# Patient Record
Sex: FEMALE | Race: WHITE | NOT HISPANIC OR LATINO | Employment: OTHER | URBAN - METROPOLITAN AREA
[De-identification: names, ages, dates, MRNs, and addresses within clinical notes are randomized per-mention and may not be internally consistent; named-entity substitution may affect disease eponyms.]

---

## 2023-08-24 LAB
CHLAMYDIA TRACH., AMPLIFIED: NEGATIVE
N. GONORRHEA, AMPLIFIED: NEGATIVE
URINE CULTURE: ABNORMAL

## 2023-09-12 LAB
ALANINE AMINOTRANSFERASE (SGPT) (U/L) IN SER/PLAS: 7 U/L (ref 7–45)
ALBUMIN (G/DL) IN SER/PLAS: 4.1 G/DL (ref 3.4–5)
ALKALINE PHOSPHATASE (U/L) IN SER/PLAS: 25 U/L (ref 33–110)
ANION GAP IN SER/PLAS: 15 MMOL/L (ref 10–20)
ASPARTATE AMINOTRANSFERASE (SGOT) (U/L) IN SER/PLAS: 10 U/L (ref 9–39)
BILIRUBIN TOTAL (MG/DL) IN SER/PLAS: 0.4 MG/DL (ref 0–1.2)
CALCIUM (MG/DL) IN SER/PLAS: 9.5 MG/DL (ref 8.6–10.6)
CARBON DIOXIDE, TOTAL (MMOL/L) IN SER/PLAS: 21 MMOL/L (ref 21–32)
CHLORIDE (MMOL/L) IN SER/PLAS: 104 MMOL/L (ref 98–107)
CREATININE (MG/DL) IN SER/PLAS: 0.47 MG/DL (ref 0.5–1.05)
ERYTHROCYTE DISTRIBUTION WIDTH (RATIO) BY AUTOMATED COUNT: 13.3 % (ref 11.5–14.5)
ERYTHROCYTE MEAN CORPUSCULAR HEMOGLOBIN CONCENTRATION (G/DL) BY AUTOMATED: 32.6 G/DL (ref 32–36)
ERYTHROCYTE MEAN CORPUSCULAR VOLUME (FL) BY AUTOMATED COUNT: 95 FL (ref 80–100)
ERYTHROCYTES (10*6/UL) IN BLOOD BY AUTOMATED COUNT: 3.69 X10E12/L (ref 4–5.2)
GFR FEMALE: >90 ML/MIN/1.73M2
GLUCOSE (MG/DL) IN SER/PLAS: 75 MG/DL (ref 74–99)
HEMATOCRIT (%) IN BLOOD BY AUTOMATED COUNT: 35 % (ref 36–46)
HEMOGLOBIN (G/DL) IN BLOOD: 11.4 G/DL (ref 12–16)
HEPATITIS B VIRUS SURFACE AG PRESENCE IN SERUM: NONREACTIVE
HEPATITIS C VIRUS AB PRESENCE IN SERUM: NONREACTIVE
HIV 1/ 2 AG/AB SCREEN: NONREACTIVE
LEUKOCYTES (10*3/UL) IN BLOOD BY AUTOMATED COUNT: 5.8 X10E9/L (ref 4.4–11.3)
NRBC (PER 100 WBCS) BY AUTOMATED COUNT: 0 /100 WBC (ref 0–0)
PLATELETS (10*3/UL) IN BLOOD AUTOMATED COUNT: 207 X10E9/L (ref 150–450)
POTASSIUM (MMOL/L) IN SER/PLAS: 4 MMOL/L (ref 3.5–5.3)
PROTEIN TOTAL: 6.5 G/DL (ref 6.4–8.2)
REFLEX ADDED, ANEMIA PANEL: ABNORMAL
SODIUM (MMOL/L) IN SER/PLAS: 136 MMOL/L (ref 136–145)
UREA NITROGEN (MG/DL) IN SER/PLAS: 9 MG/DL (ref 6–23)

## 2023-09-13 LAB
ABO GROUP (TYPE) IN BLOOD: NORMAL
ANTIBODY SCREEN: NORMAL
RH FACTOR: NORMAL
RUBELLA VIRUS IGG AB: POSITIVE
SYPHILIS TOTAL AB: NONREACTIVE

## 2023-09-14 LAB — BILE ACID: <1 UMOL/L (ref 0–10)

## 2023-10-09 ENCOUNTER — APPOINTMENT (OUTPATIENT)
Dept: OBSTETRICS AND GYNECOLOGY | Facility: CLINIC | Age: 33
End: 2023-10-09
Payer: COMMERCIAL

## 2023-10-11 PROBLEM — F41.8 DEPRESSION WITH ANXIETY: Status: ACTIVE | Noted: 2023-10-11

## 2023-10-11 PROBLEM — N91.1 AMENORRHEA, SECONDARY: Status: ACTIVE | Noted: 2023-10-11

## 2023-10-11 PROBLEM — Z14.1 CYSTIC FIBROSIS CARRIER: Status: ACTIVE | Noted: 2023-10-11

## 2023-10-11 RX ORDER — MAGNESIUM HYDROXIDE 400 MG/5ML
SUSPENSION, ORAL (FINAL DOSE FORM) ORAL
COMMUNITY
Start: 2023-08-22

## 2023-10-11 RX ORDER — ONDANSETRON 4 MG/1
TABLET, ORALLY DISINTEGRATING ORAL
COMMUNITY
Start: 2023-08-22 | End: 2023-10-13 | Stop reason: SDUPTHER

## 2023-10-13 ENCOUNTER — ROUTINE PRENATAL (OUTPATIENT)
Dept: OBSTETRICS AND GYNECOLOGY | Facility: CLINIC | Age: 33
End: 2023-10-13
Payer: COMMERCIAL

## 2023-10-13 VITALS — SYSTOLIC BLOOD PRESSURE: 112 MMHG | WEIGHT: 161 LBS | BODY MASS INDEX: 27.64 KG/M2 | DIASTOLIC BLOOD PRESSURE: 68 MMHG

## 2023-10-13 DIAGNOSIS — Z3A.20 20 WEEKS GESTATION OF PREGNANCY (HHS-HCC): ICD-10-CM

## 2023-10-13 DIAGNOSIS — R11.10 HYPEREMESIS: Primary | ICD-10-CM

## 2023-10-13 PROCEDURE — 0501F PRENATAL FLOW SHEET: CPT | Performed by: OBSTETRICS & GYNECOLOGY

## 2023-10-13 RX ORDER — ONDANSETRON 4 MG/1
4 TABLET, ORALLY DISINTEGRATING ORAL EVERY 8 HOURS PRN
Qty: 30 TABLET | Refills: 5 | Status: SHIPPED | OUTPATIENT
Start: 2023-10-13 | End: 2024-03-25 | Stop reason: HOSPADM

## 2023-11-10 ENCOUNTER — APPOINTMENT (OUTPATIENT)
Dept: OBSTETRICS AND GYNECOLOGY | Facility: CLINIC | Age: 33
End: 2023-11-10
Payer: COMMERCIAL

## 2023-11-10 ENCOUNTER — APPOINTMENT (OUTPATIENT)
Dept: RADIOLOGY | Facility: CLINIC | Age: 33
End: 2023-11-10
Payer: COMMERCIAL

## 2023-11-14 ENCOUNTER — ANCILLARY PROCEDURE (OUTPATIENT)
Dept: RADIOLOGY | Facility: CLINIC | Age: 33
End: 2023-11-14
Payer: COMMERCIAL

## 2023-11-14 ENCOUNTER — ROUTINE PRENATAL (OUTPATIENT)
Dept: OBSTETRICS AND GYNECOLOGY | Facility: CLINIC | Age: 33
End: 2023-11-14
Payer: COMMERCIAL

## 2023-11-14 VITALS — WEIGHT: 167 LBS | BODY MASS INDEX: 28.67 KG/M2

## 2023-11-14 DIAGNOSIS — Z36.89 SCREENING, ANTENATAL, FOR FETAL ANATOMIC SURVEY (HHS-HCC): ICD-10-CM

## 2023-11-14 DIAGNOSIS — Z3A.20 20 WEEKS GESTATION OF PREGNANCY (HHS-HCC): ICD-10-CM

## 2023-11-14 DIAGNOSIS — Z34.82 ENCOUNTER FOR SUPERVISION OF NORMAL PREGNANCY IN MULTIGRAVIDA IN SECOND TRIMESTER (HHS-HCC): ICD-10-CM

## 2023-11-14 DIAGNOSIS — Z36.2 ENCOUNTER FOR FOLLOW-UP ULTRASOUND OF FETAL ANATOMY (HHS-HCC): ICD-10-CM

## 2023-11-14 PROCEDURE — 76811 OB US DETAILED SNGL FETUS: CPT | Performed by: OBSTETRICS & GYNECOLOGY

## 2023-11-14 PROCEDURE — 0501F PRENATAL FLOW SHEET: CPT | Performed by: OBSTETRICS & GYNECOLOGY

## 2023-11-14 NOTE — PROGRESS NOTES
"Pt feels low back pain and pelvic pressure \"like late in pregnancy last time.\"  No ctx.  Cx LTC  Disc PTL guidelines, but no evidence of cervical change.    Baby active  \"Leaning towards\" RLTCS  "

## 2023-12-12 ENCOUNTER — ROUTINE PRENATAL (OUTPATIENT)
Dept: OBSTETRICS AND GYNECOLOGY | Facility: CLINIC | Age: 33
End: 2023-12-12
Payer: COMMERCIAL

## 2023-12-12 ENCOUNTER — ANCILLARY PROCEDURE (OUTPATIENT)
Dept: RADIOLOGY | Facility: CLINIC | Age: 33
End: 2023-12-12
Payer: COMMERCIAL

## 2023-12-12 VITALS — DIASTOLIC BLOOD PRESSURE: 64 MMHG | WEIGHT: 172 LBS | SYSTOLIC BLOOD PRESSURE: 98 MMHG | BODY MASS INDEX: 29.52 KG/M2

## 2023-12-12 DIAGNOSIS — Z3A.24 24 WEEKS GESTATION OF PREGNANCY (HHS-HCC): ICD-10-CM

## 2023-12-12 DIAGNOSIS — Z34.82 ENCOUNTER FOR SUPERVISION OF NORMAL PREGNANCY IN MULTIGRAVIDA IN SECOND TRIMESTER (HHS-HCC): ICD-10-CM

## 2023-12-12 DIAGNOSIS — Z36.2 ENCOUNTER FOR FOLLOW-UP ULTRASOUND OF FETAL ANATOMY (HHS-HCC): ICD-10-CM

## 2023-12-12 PROCEDURE — 0501F PRENATAL FLOW SHEET: CPT | Performed by: OBSTETRICS & GYNECOLOGY

## 2023-12-12 PROCEDURE — 76815 OB US LIMITED FETUS(S): CPT | Performed by: OBSTETRICS & GYNECOLOGY

## 2024-01-10 ENCOUNTER — APPOINTMENT (OUTPATIENT)
Dept: OBSTETRICS AND GYNECOLOGY | Facility: CLINIC | Age: 34
End: 2024-01-10
Payer: COMMERCIAL

## 2024-01-12 ENCOUNTER — APPOINTMENT (OUTPATIENT)
Dept: OBSTETRICS AND GYNECOLOGY | Facility: CLINIC | Age: 34
End: 2024-01-12
Payer: COMMERCIAL

## 2024-01-15 ENCOUNTER — ROUTINE PRENATAL (OUTPATIENT)
Dept: OBSTETRICS AND GYNECOLOGY | Facility: CLINIC | Age: 34
End: 2024-01-15
Payer: COMMERCIAL

## 2024-01-15 VITALS — DIASTOLIC BLOOD PRESSURE: 60 MMHG | WEIGHT: 175 LBS | BODY MASS INDEX: 30.04 KG/M2 | SYSTOLIC BLOOD PRESSURE: 112 MMHG

## 2024-01-15 DIAGNOSIS — Z34.83 MULTIGRAVIDA IN THIRD TRIMESTER (HHS-HCC): Primary | ICD-10-CM

## 2024-01-15 PROCEDURE — 0501F PRENATAL FLOW SHEET: CPT | Performed by: OBSTETRICS & GYNECOLOGY

## 2024-01-15 PROCEDURE — 36415 COLL VENOUS BLD VENIPUNCTURE: CPT

## 2024-01-15 PROCEDURE — 85027 COMPLETE CBC AUTOMATED: CPT

## 2024-01-15 PROCEDURE — 82947 ASSAY GLUCOSE BLOOD QUANT: CPT

## 2024-01-15 NOTE — PROGRESS NOTES
Feels well.  Baby active 1* gtt today.  Revisited route of delivery and pt would like TOLAC.  Have Disc Alt/R/B  MFMU 64%

## 2024-01-16 ENCOUNTER — TELEPHONE (OUTPATIENT)
Dept: OBSTETRICS AND GYNECOLOGY | Facility: CLINIC | Age: 34
End: 2024-01-16
Payer: COMMERCIAL

## 2024-01-16 LAB
ERYTHROCYTE [DISTWIDTH] IN BLOOD BY AUTOMATED COUNT: 13 % (ref 11.5–14.5)
GLUCOSE 1H P 50 G GLC PO SERPL-MCNC: 115 MG/DL
HCT VFR BLD AUTO: 31.2 % (ref 36–46)
HGB BLD-MCNC: 10.1 G/DL (ref 12–16)
MCH RBC QN AUTO: 31.6 PG (ref 26–34)
MCHC RBC AUTO-ENTMCNC: 32.4 G/DL (ref 32–36)
MCV RBC AUTO: 98 FL (ref 80–100)
NRBC BLD-RTO: 0 /100 WBCS (ref 0–0)
PLATELET # BLD AUTO: 203 X10*3/UL (ref 150–450)
RBC # BLD AUTO: 3.2 X10*6/UL (ref 4–5.2)
WBC # BLD AUTO: 7.8 X10*3/UL (ref 4.4–11.3)

## 2024-01-16 NOTE — TELEPHONE ENCOUNTER
----- Message from Yuniel Preston MD sent at 1/16/2024  6:22 AM EST -----  No MyChart - Pls call and inform - 1 hr glucose test nl but mildly anemic at 10.5  Rec fe every other day.    Thx

## 2024-02-05 ENCOUNTER — ROUTINE PRENATAL (OUTPATIENT)
Dept: OBSTETRICS AND GYNECOLOGY | Facility: CLINIC | Age: 34
End: 2024-02-05
Payer: COMMERCIAL

## 2024-02-05 ENCOUNTER — APPOINTMENT (OUTPATIENT)
Dept: OBSTETRICS AND GYNECOLOGY | Facility: CLINIC | Age: 34
End: 2024-02-05
Payer: COMMERCIAL

## 2024-02-05 VITALS — WEIGHT: 177 LBS | DIASTOLIC BLOOD PRESSURE: 80 MMHG | BODY MASS INDEX: 30.38 KG/M2 | SYSTOLIC BLOOD PRESSURE: 130 MMHG

## 2024-02-05 DIAGNOSIS — Z34.83 MULTIGRAVIDA IN THIRD TRIMESTER (HHS-HCC): Primary | ICD-10-CM

## 2024-02-05 DIAGNOSIS — Z3A.32 32 WEEKS GESTATION OF PREGNANCY (HHS-HCC): ICD-10-CM

## 2024-02-05 PROCEDURE — 90471 IMMUNIZATION ADMIN: CPT | Performed by: OBSTETRICS & GYNECOLOGY

## 2024-02-05 PROCEDURE — 0501F PRENATAL FLOW SHEET: CPT | Performed by: OBSTETRICS & GYNECOLOGY

## 2024-02-05 PROCEDURE — 90715 TDAP VACCINE 7 YRS/> IM: CPT | Performed by: OBSTETRICS & GYNECOLOGY

## 2024-02-05 NOTE — PROGRESS NOTES
Patient ID: Magdalena Bai is a 33 y.o. female.    TDAP    Given 2/5/24  IM Right Deltoid  Lot# [id number]  Exp: 2/2/26  NDC: [id number]

## 2024-02-19 ENCOUNTER — ROUTINE PRENATAL (OUTPATIENT)
Dept: OBSTETRICS AND GYNECOLOGY | Facility: CLINIC | Age: 34
End: 2024-02-19
Payer: COMMERCIAL

## 2024-02-19 VITALS — DIASTOLIC BLOOD PRESSURE: 78 MMHG | BODY MASS INDEX: 31.41 KG/M2 | SYSTOLIC BLOOD PRESSURE: 118 MMHG | WEIGHT: 183 LBS

## 2024-02-19 DIAGNOSIS — Z3A.34 34 WEEKS GESTATION OF PREGNANCY (HHS-HCC): Primary | ICD-10-CM

## 2024-02-19 PROCEDURE — 0501F PRENATAL FLOW SHEET: CPT | Performed by: OBSTETRICS & GYNECOLOGY

## 2024-02-19 NOTE — PROGRESS NOTES
Feels well except acid reflux - pepcid prn as tums ineffective.    Had root canal - feels better.

## 2024-03-05 ENCOUNTER — APPOINTMENT (OUTPATIENT)
Dept: OBSTETRICS AND GYNECOLOGY | Facility: CLINIC | Age: 34
End: 2024-03-05
Payer: COMMERCIAL

## 2024-03-05 ENCOUNTER — APPOINTMENT (OUTPATIENT)
Dept: RADIOLOGY | Facility: CLINIC | Age: 34
End: 2024-03-05
Payer: COMMERCIAL

## 2024-03-07 ENCOUNTER — ROUTINE PRENATAL (OUTPATIENT)
Dept: OBSTETRICS AND GYNECOLOGY | Facility: CLINIC | Age: 34
End: 2024-03-07
Payer: COMMERCIAL

## 2024-03-07 ENCOUNTER — HOSPITAL ENCOUNTER (OUTPATIENT)
Dept: RADIOLOGY | Facility: CLINIC | Age: 34
Discharge: HOME | End: 2024-03-07
Payer: COMMERCIAL

## 2024-03-07 VITALS — DIASTOLIC BLOOD PRESSURE: 80 MMHG | BODY MASS INDEX: 31.93 KG/M2 | SYSTOLIC BLOOD PRESSURE: 130 MMHG | WEIGHT: 186 LBS

## 2024-03-07 DIAGNOSIS — Z3A.32 32 WEEKS GESTATION OF PREGNANCY (HHS-HCC): ICD-10-CM

## 2024-03-07 DIAGNOSIS — Z34.83 MULTIGRAVIDA IN THIRD TRIMESTER (HHS-HCC): Primary | ICD-10-CM

## 2024-03-07 PROCEDURE — 87081 CULTURE SCREEN ONLY: CPT

## 2024-03-07 PROCEDURE — 76815 OB US LIMITED FETUS(S): CPT | Performed by: OBSTETRICS & GYNECOLOGY

## 2024-03-07 PROCEDURE — 76819 FETAL BIOPHYS PROFIL W/O NST: CPT | Performed by: OBSTETRICS & GYNECOLOGY

## 2024-03-07 PROCEDURE — 0501F PRENATAL FLOW SHEET: CPT | Performed by: OBSTETRICS & GYNECOLOGY

## 2024-03-07 NOTE — PROGRESS NOTES
Baby active   GBS obtained   Cx closed.  Notes increase in edema, but BP here and at home 120-130/60-70's

## 2024-03-09 LAB — GP B STREP GENITAL QL CULT: NORMAL

## 2024-03-12 ENCOUNTER — APPOINTMENT (OUTPATIENT)
Dept: OBSTETRICS AND GYNECOLOGY | Facility: CLINIC | Age: 34
End: 2024-03-12
Payer: COMMERCIAL

## 2024-03-18 ENCOUNTER — ROUTINE PRENATAL (OUTPATIENT)
Dept: OBSTETRICS AND GYNECOLOGY | Facility: CLINIC | Age: 34
End: 2024-03-18
Payer: COMMERCIAL

## 2024-03-18 VITALS — WEIGHT: 190 LBS | DIASTOLIC BLOOD PRESSURE: 78 MMHG | SYSTOLIC BLOOD PRESSURE: 122 MMHG | BODY MASS INDEX: 32.61 KG/M2

## 2024-03-18 DIAGNOSIS — Z3A.38 38 WEEKS GESTATION OF PREGNANCY (HHS-HCC): Primary | ICD-10-CM

## 2024-03-18 PROCEDURE — 59426 ANTEPARTUM CARE ONLY: CPT | Performed by: OBSTETRICS & GYNECOLOGY

## 2024-03-22 ENCOUNTER — ANESTHESIA (OUTPATIENT)
Dept: OBSTETRICS AND GYNECOLOGY | Facility: HOSPITAL | Age: 34
End: 2024-03-22
Payer: COMMERCIAL

## 2024-03-22 ENCOUNTER — HOSPITAL ENCOUNTER (INPATIENT)
Facility: HOSPITAL | Age: 34
LOS: 3 days | Discharge: HOME | End: 2024-03-25
Attending: OBSTETRICS & GYNECOLOGY | Admitting: OBSTETRICS & GYNECOLOGY
Payer: COMMERCIAL

## 2024-03-22 ENCOUNTER — ANESTHESIA EVENT (OUTPATIENT)
Dept: OBSTETRICS AND GYNECOLOGY | Facility: HOSPITAL | Age: 34
End: 2024-03-22
Payer: COMMERCIAL

## 2024-03-22 DIAGNOSIS — Z34.83 MULTIGRAVIDA IN THIRD TRIMESTER (HHS-HCC): Primary | ICD-10-CM

## 2024-03-22 DIAGNOSIS — O47.9 UTERINE CONTRACTIONS (HHS-HCC): ICD-10-CM

## 2024-03-22 DIAGNOSIS — Z98.891 HISTORY OF LOW TRANSVERSE CESAREAN SECTION: ICD-10-CM

## 2024-03-22 DIAGNOSIS — O34.211 ENCOUNTER FOR MATERNAL CARE FOR LOW TRANSVERSE SCAR FROM REPEAT CESAREAN DELIVERY (HHS-HCC): ICD-10-CM

## 2024-03-22 LAB
ABO GROUP (TYPE) IN BLOOD: NORMAL
ANTIBODY SCREEN: NORMAL
ERYTHROCYTE [DISTWIDTH] IN BLOOD BY AUTOMATED COUNT: 13.9 % (ref 11.5–14.5)
HCT VFR BLD AUTO: 27.4 % (ref 36–46)
HGB BLD-MCNC: 8.8 G/DL (ref 12–16)
MCH RBC QN AUTO: 27.9 PG (ref 26–34)
MCHC RBC AUTO-ENTMCNC: 32.1 G/DL (ref 32–36)
MCV RBC AUTO: 87 FL (ref 80–100)
NRBC BLD-RTO: 0 /100 WBCS (ref 0–0)
PLATELET # BLD AUTO: 216 X10*3/UL (ref 150–450)
RBC # BLD AUTO: 3.15 X10*6/UL (ref 4–5.2)
RH FACTOR (ANTIGEN D): NORMAL
WBC # BLD AUTO: 8.4 X10*3/UL (ref 4.4–11.3)

## 2024-03-22 PROCEDURE — 86901 BLOOD TYPING SEROLOGIC RH(D): CPT | Performed by: NURSE PRACTITIONER

## 2024-03-22 PROCEDURE — 36415 COLL VENOUS BLD VENIPUNCTURE: CPT | Performed by: OBSTETRICS & GYNECOLOGY

## 2024-03-22 PROCEDURE — 1220000001 HC OB SEMI-PRIVATE ROOM DAILY

## 2024-03-22 PROCEDURE — 86920 COMPATIBILITY TEST SPIN: CPT

## 2024-03-22 PROCEDURE — 85027 COMPLETE CBC AUTOMATED: CPT | Performed by: NURSE PRACTITIONER

## 2024-03-22 PROCEDURE — 86780 TREPONEMA PALLIDUM: CPT | Mod: AHULAB | Performed by: NURSE PRACTITIONER

## 2024-03-22 PROCEDURE — 2500000004 HC RX 250 GENERAL PHARMACY W/ HCPCS (ALT 636 FOR OP/ED): Performed by: NURSE PRACTITIONER

## 2024-03-22 PROCEDURE — 36415 COLL VENOUS BLD VENIPUNCTURE: CPT | Performed by: NURSE PRACTITIONER

## 2024-03-22 RX ORDER — OXYTOCIN 10 [USP'U]/ML
10 INJECTION, SOLUTION INTRAMUSCULAR; INTRAVENOUS ONCE AS NEEDED
Status: DISCONTINUED | OUTPATIENT
Start: 2024-03-22 | End: 2024-03-23

## 2024-03-22 RX ORDER — MISOPROSTOL 200 UG/1
800 TABLET ORAL ONCE AS NEEDED
Status: DISCONTINUED | OUTPATIENT
Start: 2024-03-22 | End: 2024-03-23

## 2024-03-22 RX ORDER — METOCLOPRAMIDE 10 MG/1
10 TABLET ORAL EVERY 6 HOURS PRN
Status: DISCONTINUED | OUTPATIENT
Start: 2024-03-22 | End: 2024-03-23

## 2024-03-22 RX ORDER — ONDANSETRON 4 MG/1
4 TABLET, FILM COATED ORAL EVERY 6 HOURS PRN
Status: DISCONTINUED | OUTPATIENT
Start: 2024-03-22 | End: 2024-03-23

## 2024-03-22 RX ORDER — LOPERAMIDE HYDROCHLORIDE 2 MG/1
4 CAPSULE ORAL EVERY 2 HOUR PRN
Status: DISCONTINUED | OUTPATIENT
Start: 2024-03-22 | End: 2024-03-23

## 2024-03-22 RX ORDER — CEFAZOLIN SODIUM 2 G/100ML
2 INJECTION, SOLUTION INTRAVENOUS ONCE
Status: COMPLETED | OUTPATIENT
Start: 2024-03-22 | End: 2024-03-22

## 2024-03-22 RX ORDER — ONDANSETRON HYDROCHLORIDE 2 MG/ML
4 INJECTION, SOLUTION INTRAVENOUS EVERY 6 HOURS PRN
Status: DISCONTINUED | OUTPATIENT
Start: 2024-03-22 | End: 2024-03-23

## 2024-03-22 RX ORDER — SODIUM CHLORIDE, SODIUM LACTATE, POTASSIUM CHLORIDE, CALCIUM CHLORIDE 600; 310; 30; 20 MG/100ML; MG/100ML; MG/100ML; MG/100ML
125 INJECTION, SOLUTION INTRAVENOUS CONTINUOUS
Status: DISCONTINUED | OUTPATIENT
Start: 2024-03-22 | End: 2024-03-23

## 2024-03-22 RX ORDER — LABETALOL HYDROCHLORIDE 5 MG/ML
20 INJECTION, SOLUTION INTRAVENOUS ONCE AS NEEDED
Status: DISCONTINUED | OUTPATIENT
Start: 2024-03-22 | End: 2024-03-23

## 2024-03-22 RX ORDER — METOCLOPRAMIDE HYDROCHLORIDE 5 MG/ML
10 INJECTION INTRAMUSCULAR; INTRAVENOUS EVERY 6 HOURS PRN
Status: DISCONTINUED | OUTPATIENT
Start: 2024-03-22 | End: 2024-03-23

## 2024-03-22 RX ORDER — OXYTOCIN/0.9 % SODIUM CHLORIDE 30/500 ML
60 PLASTIC BAG, INJECTION (ML) INTRAVENOUS ONCE AS NEEDED
Status: COMPLETED | OUTPATIENT
Start: 2024-03-22 | End: 2024-03-23

## 2024-03-22 RX ORDER — HYDRALAZINE HYDROCHLORIDE 20 MG/ML
5 INJECTION INTRAMUSCULAR; INTRAVENOUS ONCE AS NEEDED
Status: DISCONTINUED | OUTPATIENT
Start: 2024-03-22 | End: 2024-03-23

## 2024-03-22 RX ORDER — TERBUTALINE SULFATE 1 MG/ML
0.25 INJECTION SUBCUTANEOUS ONCE AS NEEDED
Status: DISCONTINUED | OUTPATIENT
Start: 2024-03-22 | End: 2024-03-23

## 2024-03-22 RX ORDER — TRANEXAMIC ACID 100 MG/ML
1000 INJECTION, SOLUTION INTRAVENOUS ONCE AS NEEDED
Status: DISCONTINUED | OUTPATIENT
Start: 2024-03-22 | End: 2024-03-23

## 2024-03-22 RX ORDER — CARBOPROST TROMETHAMINE 250 UG/ML
250 INJECTION, SOLUTION INTRAMUSCULAR ONCE AS NEEDED
Status: DISCONTINUED | OUTPATIENT
Start: 2024-03-22 | End: 2024-03-23

## 2024-03-22 RX ORDER — NIFEDIPINE 10 MG/1
10 CAPSULE ORAL ONCE AS NEEDED
Status: DISCONTINUED | OUTPATIENT
Start: 2024-03-22 | End: 2024-03-23

## 2024-03-22 RX ORDER — METHYLERGONOVINE MALEATE 0.2 MG/ML
0.2 INJECTION INTRAVENOUS ONCE AS NEEDED
Status: DISCONTINUED | OUTPATIENT
Start: 2024-03-22 | End: 2024-03-23

## 2024-03-22 RX ORDER — LIDOCAINE HYDROCHLORIDE 10 MG/ML
30 INJECTION INFILTRATION; PERINEURAL ONCE AS NEEDED
Status: DISCONTINUED | OUTPATIENT
Start: 2024-03-22 | End: 2024-03-23

## 2024-03-22 RX ADMIN — SODIUM CHLORIDE, POTASSIUM CHLORIDE, SODIUM LACTATE AND CALCIUM CHLORIDE 125 ML/HR: 600; 310; 30; 20 INJECTION, SOLUTION INTRAVENOUS at 22:15

## 2024-03-22 RX ADMIN — SODIUM CHLORIDE, POTASSIUM CHLORIDE, SODIUM LACTATE AND CALCIUM CHLORIDE 500 ML: 600; 310; 30; 20 INJECTION, SOLUTION INTRAVENOUS at 23:13

## 2024-03-22 RX ADMIN — CEFAZOLIN SODIUM 2 G: 2 INJECTION, SOLUTION INTRAVENOUS at 23:14

## 2024-03-22 RX ADMIN — METOCLOPRAMIDE 10 MG: 5 INJECTION, SOLUTION INTRAMUSCULAR; INTRAVENOUS at 23:55

## 2024-03-22 SDOH — HEALTH STABILITY: MENTAL HEALTH: WERE YOU ABLE TO COMPLETE ALL THE BEHAVIORAL HEALTH SCREENINGS?: YES

## 2024-03-22 SDOH — HEALTH STABILITY: MENTAL HEALTH: WISH TO BE DEAD (PAST 1 MONTH): NO

## 2024-03-22 SDOH — SOCIAL STABILITY: SOCIAL INSECURITY: DOES ANYONE TRY TO KEEP YOU FROM HAVING/CONTACTING OTHER FRIENDS OR DOING THINGS OUTSIDE YOUR HOME?: NO

## 2024-03-22 SDOH — HEALTH STABILITY: MENTAL HEALTH: SUICIDAL BEHAVIOR (LIFETIME): NO

## 2024-03-22 SDOH — SOCIAL STABILITY: SOCIAL INSECURITY: HAVE YOU HAD THOUGHTS OF HARMING ANYONE ELSE?: NO

## 2024-03-22 SDOH — SOCIAL STABILITY: SOCIAL INSECURITY: HAS ANYONE EVER THREATENED TO HURT YOUR FAMILY OR YOUR PETS?: NO

## 2024-03-22 SDOH — ECONOMIC STABILITY: HOUSING INSECURITY: DO YOU FEEL UNSAFE GOING BACK TO THE PLACE WHERE YOU ARE LIVING?: NO

## 2024-03-22 SDOH — SOCIAL STABILITY: SOCIAL INSECURITY: ARE YOU OR HAVE YOU BEEN THREATENED OR ABUSED PHYSICALLY, EMOTIONALLY, OR SEXUALLY BY ANYONE?: NO

## 2024-03-22 SDOH — SOCIAL STABILITY: SOCIAL INSECURITY: PHYSICAL ABUSE: DENIES

## 2024-03-22 SDOH — SOCIAL STABILITY: SOCIAL INSECURITY: VERBAL ABUSE: DENIES

## 2024-03-22 SDOH — SOCIAL STABILITY: SOCIAL INSECURITY: ARE THERE ANY APPARENT SIGNS OF INJURIES/BEHAVIORS THAT COULD BE RELATED TO ABUSE/NEGLECT?: NO

## 2024-03-22 SDOH — HEALTH STABILITY: MENTAL HEALTH: HAVE YOU USED ANY PRESCRIPTION DRUGS OTHER THAN PRESCRIBED IN THE PAST 12 MONTHS?: NO

## 2024-03-22 SDOH — HEALTH STABILITY: MENTAL HEALTH: CURRENT SMOKER: 0

## 2024-03-22 SDOH — SOCIAL STABILITY: SOCIAL INSECURITY: ABUSE SCREEN: ADULT

## 2024-03-22 SDOH — HEALTH STABILITY: MENTAL HEALTH: HAVE YOU USED ANY SUBSTANCES (CANABIS, COCAINE, HEROIN, HALLUCINOGENS, INHALANTS, ETC.) IN THE PAST 12 MONTHS?: NO

## 2024-03-22 SDOH — SOCIAL STABILITY: SOCIAL INSECURITY: DO YOU FEEL ANYONE HAS EXPLOITED OR TAKEN ADVANTAGE OF YOU FINANCIALLY OR OF YOUR PERSONAL PROPERTY?: NO

## 2024-03-22 SDOH — HEALTH STABILITY: MENTAL HEALTH: NON-SPECIFIC ACTIVE SUICIDAL THOUGHTS (PAST 1 MONTH): NO

## 2024-03-22 ASSESSMENT — PATIENT HEALTH QUESTIONNAIRE - PHQ9
2. FEELING DOWN, DEPRESSED OR HOPELESS: NOT AT ALL
1. LITTLE INTEREST OR PLEASURE IN DOING THINGS: NOT AT ALL
SUM OF ALL RESPONSES TO PHQ9 QUESTIONS 1 & 2: 0

## 2024-03-22 ASSESSMENT — LIFESTYLE VARIABLES
HOW OFTEN DO YOU HAVE A DRINK CONTAINING ALCOHOL: NEVER
HOW OFTEN DO YOU HAVE 6 OR MORE DRINKS ON ONE OCCASION: NEVER
SKIP TO QUESTIONS 9-10: 1
AUDIT-C TOTAL SCORE: 0
AUDIT-C TOTAL SCORE: 0
HOW MANY STANDARD DRINKS CONTAINING ALCOHOL DO YOU HAVE ON A TYPICAL DAY: PATIENT DOES NOT DRINK

## 2024-03-22 ASSESSMENT — PAIN SCALES - GENERAL: PAINLEVEL_OUTOF10: 8

## 2024-03-23 LAB
ABO GROUP (TYPE) IN BLOOD: NORMAL
BASE EXCESS BLDCOA CALC-SCNC: -5.4 MMOL/L (ref -10.8–-0.5)
BASE EXCESS BLDCOV CALC-SCNC: -5.4 MMOL/L (ref -8.1–-0.5)
BODY TEMPERATURE: 37 DEGREES CELSIUS
BODY TEMPERATURE: 37 DEGREES CELSIUS
ERYTHROCYTE [DISTWIDTH] IN BLOOD BY AUTOMATED COUNT: 13.9 % (ref 11.5–14.5)
HCO3 BLDCOA-SCNC: 24.6 MMOL/L (ref 15–29)
HCO3 BLDCOV-SCNC: 19.2 MMOL/L (ref 16–26)
HCT VFR BLD AUTO: 24 % (ref 36–46)
HGB BLD-MCNC: 7.3 G/DL (ref 12–16)
HOLD SPECIMEN: NORMAL
INHALED O2 CONCENTRATION: 21 %
INHALED O2 CONCENTRATION: 21 %
MCH RBC QN AUTO: 27.7 PG (ref 26–34)
MCHC RBC AUTO-ENTMCNC: 30.4 G/DL (ref 32–36)
MCV RBC AUTO: 91 FL (ref 80–100)
NRBC BLD-RTO: 0 /100 WBCS (ref 0–0)
OXYHGB MFR BLDCOA: 24.5 % (ref 94–98)
OXYHGB MFR BLDCOV: 85.9 % (ref 94–98)
PCO2 BLDCOA: 69 MM HG (ref 31–75)
PCO2 BLDCOV: 34 MM HG (ref 22–53)
PH BLDCOA: 7.16 PH (ref 7.08–7.39)
PH BLDCOV: 7.36 PH (ref 7.19–7.47)
PLATELET # BLD AUTO: 159 X10*3/UL (ref 150–450)
PO2 BLDCOA: 24 MM HG (ref 5–31)
PO2 BLDCOV: 54 MM HG (ref 13–37)
RBC # BLD AUTO: 2.64 X10*6/UL (ref 4–5.2)
RH FACTOR (ANTIGEN D): NORMAL
SAO2 % BLDCOA: 25 % (ref 3–69)
SAO2 % BLDCOV: 88 % (ref 16–84)
TEST COMMENT: ABNORMAL
TEST COMMENT: ABNORMAL
TREPONEMA PALLIDUM IGG+IGM AB [PRESENCE] IN SERUM OR PLASMA BY IMMUNOASSAY: NONREACTIVE
WBC # BLD AUTO: 9 X10*3/UL (ref 4.4–11.3)

## 2024-03-23 PROCEDURE — 01961 ANES CESAREAN DELIVERY ONLY: CPT | Performed by: NURSE ANESTHETIST, CERTIFIED REGISTERED

## 2024-03-23 PROCEDURE — A6213 FOAM DRG >16<=48 SQ IN W/BDR: HCPCS | Performed by: OBSTETRICS & GYNECOLOGY

## 2024-03-23 PROCEDURE — 2500000004 HC RX 250 GENERAL PHARMACY W/ HCPCS (ALT 636 FOR OP/ED): Performed by: NURSE PRACTITIONER

## 2024-03-23 PROCEDURE — 3700000018 HC OB ANESTHESIA C-SECTION: Performed by: OBSTETRICS & GYNECOLOGY

## 2024-03-23 PROCEDURE — 82805 BLOOD GASES W/O2 SATURATION: CPT | Performed by: NURSE PRACTITIONER

## 2024-03-23 PROCEDURE — 85027 COMPLETE CBC AUTOMATED: CPT | Performed by: NURSE PRACTITIONER

## 2024-03-23 PROCEDURE — 7100000016 HC LABOR RECOVERY PER HOUR

## 2024-03-23 PROCEDURE — 59514 CESAREAN DELIVERY ONLY: CPT | Performed by: OBSTETRICS & GYNECOLOGY

## 2024-03-23 PROCEDURE — 2500000005 HC RX 250 GENERAL PHARMACY W/O HCPCS: Performed by: NURSE PRACTITIONER

## 2024-03-23 PROCEDURE — 2720000007 HC OR 272 NO HCPCS: Performed by: OBSTETRICS & GYNECOLOGY

## 2024-03-23 PROCEDURE — 7100000016 HC LABOR RECOVERY PER HOUR: Performed by: OBSTETRICS & GYNECOLOGY

## 2024-03-23 PROCEDURE — 1100000001 HC PRIVATE ROOM DAILY

## 2024-03-23 PROCEDURE — 2500000004 HC RX 250 GENERAL PHARMACY W/ HCPCS (ALT 636 FOR OP/ED): Performed by: NURSE ANESTHETIST, CERTIFIED REGISTERED

## 2024-03-23 PROCEDURE — 36415 COLL VENOUS BLD VENIPUNCTURE: CPT | Performed by: NURSE PRACTITIONER

## 2024-03-23 PROCEDURE — 59050 FETAL MONITOR W/REPORT: CPT

## 2024-03-23 PROCEDURE — 2500000005 HC RX 250 GENERAL PHARMACY W/O HCPCS: Performed by: NURSE ANESTHETIST, CERTIFIED REGISTERED

## 2024-03-23 PROCEDURE — 7210000002 HC LABOR PER HOUR

## 2024-03-23 RX ORDER — LABETALOL HYDROCHLORIDE 5 MG/ML
20 INJECTION, SOLUTION INTRAVENOUS ONCE AS NEEDED
Status: DISCONTINUED | OUTPATIENT
Start: 2024-03-23 | End: 2024-03-25 | Stop reason: HOSPADM

## 2024-03-23 RX ORDER — ONDANSETRON 4 MG/1
4 TABLET, FILM COATED ORAL EVERY 6 HOURS PRN
Status: DISCONTINUED | OUTPATIENT
Start: 2024-03-23 | End: 2024-03-25 | Stop reason: HOSPADM

## 2024-03-23 RX ORDER — DIPHENHYDRAMINE HYDROCHLORIDE 50 MG/ML
25 INJECTION INTRAMUSCULAR; INTRAVENOUS EVERY 4 HOURS PRN
Status: DISCONTINUED | OUTPATIENT
Start: 2024-03-23 | End: 2024-03-25 | Stop reason: HOSPADM

## 2024-03-23 RX ORDER — MORPHINE SULFATE 1 MG/ML
INJECTION, SOLUTION EPIDURAL; INTRATHECAL; INTRAVENOUS AS NEEDED
Status: DISCONTINUED | OUTPATIENT
Start: 2024-03-23 | End: 2024-03-23

## 2024-03-23 RX ORDER — LOPERAMIDE HYDROCHLORIDE 2 MG/1
4 CAPSULE ORAL EVERY 2 HOUR PRN
Status: DISCONTINUED | OUTPATIENT
Start: 2024-03-23 | End: 2024-03-25 | Stop reason: HOSPADM

## 2024-03-23 RX ORDER — CARBOPROST TROMETHAMINE 250 UG/ML
250 INJECTION, SOLUTION INTRAMUSCULAR ONCE AS NEEDED
Status: DISCONTINUED | OUTPATIENT
Start: 2024-03-23 | End: 2024-03-25 | Stop reason: HOSPADM

## 2024-03-23 RX ORDER — POLYETHYLENE GLYCOL 3350 17 G/17G
17 POWDER, FOR SOLUTION ORAL 2 TIMES DAILY PRN
Status: DISCONTINUED | OUTPATIENT
Start: 2024-03-23 | End: 2024-03-25 | Stop reason: HOSPADM

## 2024-03-23 RX ORDER — METHYLERGONOVINE MALEATE 0.2 MG/ML
0.2 INJECTION INTRAVENOUS ONCE AS NEEDED
Status: DISCONTINUED | OUTPATIENT
Start: 2024-03-23 | End: 2024-03-25 | Stop reason: HOSPADM

## 2024-03-23 RX ORDER — TRANEXAMIC ACID 100 MG/ML
1000 INJECTION, SOLUTION INTRAVENOUS ONCE AS NEEDED
Status: DISCONTINUED | OUTPATIENT
Start: 2024-03-23 | End: 2024-03-25 | Stop reason: HOSPADM

## 2024-03-23 RX ORDER — OXYTOCIN 10 [USP'U]/ML
10 INJECTION, SOLUTION INTRAMUSCULAR; INTRAVENOUS ONCE AS NEEDED
Status: DISCONTINUED | OUTPATIENT
Start: 2024-03-23 | End: 2024-03-25 | Stop reason: HOSPADM

## 2024-03-23 RX ORDER — KETOROLAC TROMETHAMINE 30 MG/ML
INJECTION, SOLUTION INTRAMUSCULAR; INTRAVENOUS AS NEEDED
Status: DISCONTINUED | OUTPATIENT
Start: 2024-03-23 | End: 2024-03-23

## 2024-03-23 RX ORDER — ADHESIVE BANDAGE
10 BANDAGE TOPICAL
Status: DISCONTINUED | OUTPATIENT
Start: 2024-03-23 | End: 2024-03-25 | Stop reason: HOSPADM

## 2024-03-23 RX ORDER — HYDROMORPHONE HYDROCHLORIDE 1 MG/ML
0.2 INJECTION, SOLUTION INTRAMUSCULAR; INTRAVENOUS; SUBCUTANEOUS EVERY 5 MIN PRN
Status: DISCONTINUED | OUTPATIENT
Start: 2024-03-23 | End: 2024-03-23 | Stop reason: HOSPADM

## 2024-03-23 RX ORDER — OXYCODONE HYDROCHLORIDE 5 MG/1
5 TABLET ORAL EVERY 6 HOURS PRN
Status: DISCONTINUED | OUTPATIENT
Start: 2024-03-23 | End: 2024-03-23

## 2024-03-23 RX ORDER — DIPHENHYDRAMINE HCL 25 MG
25 CAPSULE ORAL EVERY 4 HOURS PRN
Status: DISCONTINUED | OUTPATIENT
Start: 2024-03-23 | End: 2024-03-25 | Stop reason: HOSPADM

## 2024-03-23 RX ORDER — PHENYLEPHRINE HYDROCHLORIDE 10 MG/ML
INJECTION INTRAVENOUS AS NEEDED
Status: DISCONTINUED | OUTPATIENT
Start: 2024-03-23 | End: 2024-03-23

## 2024-03-23 RX ORDER — LIDOCAINE 560 MG/1
1 PATCH PERCUTANEOUS; TOPICAL; TRANSDERMAL
Status: DISCONTINUED | OUTPATIENT
Start: 2024-03-23 | End: 2024-03-25 | Stop reason: HOSPADM

## 2024-03-23 RX ORDER — NIFEDIPINE 10 MG/1
10 CAPSULE ORAL ONCE AS NEEDED
Status: DISCONTINUED | OUTPATIENT
Start: 2024-03-23 | End: 2024-03-25 | Stop reason: HOSPADM

## 2024-03-23 RX ORDER — KETOROLAC TROMETHAMINE 30 MG/ML
30 INJECTION, SOLUTION INTRAMUSCULAR; INTRAVENOUS EVERY 6 HOURS
Status: COMPLETED | OUTPATIENT
Start: 2024-03-23 | End: 2024-03-23

## 2024-03-23 RX ORDER — BUPIVACAINE HYDROCHLORIDE 7.5 MG/ML
INJECTION, SOLUTION INTRASPINAL AS NEEDED
Status: DISCONTINUED | OUTPATIENT
Start: 2024-03-23 | End: 2024-03-23

## 2024-03-23 RX ORDER — OXYCODONE HYDROCHLORIDE 5 MG/1
5 TABLET ORAL EVERY 4 HOURS PRN
Status: DISCONTINUED | OUTPATIENT
Start: 2024-03-24 | End: 2024-03-25 | Stop reason: HOSPADM

## 2024-03-23 RX ORDER — HYDROMORPHONE HYDROCHLORIDE 1 MG/ML
0.2 INJECTION, SOLUTION INTRAMUSCULAR; INTRAVENOUS; SUBCUTANEOUS EVERY 5 MIN PRN
Status: DISCONTINUED | OUTPATIENT
Start: 2024-03-23 | End: 2024-03-25 | Stop reason: HOSPADM

## 2024-03-23 RX ORDER — OXYTOCIN/0.9 % SODIUM CHLORIDE 30/500 ML
60 PLASTIC BAG, INJECTION (ML) INTRAVENOUS ONCE AS NEEDED
Status: DISCONTINUED | OUTPATIENT
Start: 2024-03-23 | End: 2024-03-25 | Stop reason: HOSPADM

## 2024-03-23 RX ORDER — IBUPROFEN 600 MG/1
600 TABLET ORAL EVERY 6 HOURS
Status: DISCONTINUED | OUTPATIENT
Start: 2024-03-24 | End: 2024-03-25 | Stop reason: HOSPADM

## 2024-03-23 RX ORDER — BUTORPHANOL TARTRATE 2 MG/ML
1 INJECTION INTRAMUSCULAR; INTRAVENOUS
Status: DISCONTINUED | OUTPATIENT
Start: 2024-03-23 | End: 2024-03-25 | Stop reason: HOSPADM

## 2024-03-23 RX ORDER — ACETAMINOPHEN 325 MG/1
975 TABLET ORAL EVERY 6 HOURS
Status: DISCONTINUED | OUTPATIENT
Start: 2024-03-23 | End: 2024-03-25 | Stop reason: HOSPADM

## 2024-03-23 RX ORDER — NALOXONE HYDROCHLORIDE 0.4 MG/ML
0.1 INJECTION, SOLUTION INTRAMUSCULAR; INTRAVENOUS; SUBCUTANEOUS EVERY 5 MIN PRN
Status: DISCONTINUED | OUTPATIENT
Start: 2024-03-23 | End: 2024-03-25 | Stop reason: HOSPADM

## 2024-03-23 RX ORDER — ONDANSETRON HYDROCHLORIDE 2 MG/ML
4 INJECTION, SOLUTION INTRAVENOUS EVERY 6 HOURS PRN
Status: DISCONTINUED | OUTPATIENT
Start: 2024-03-23 | End: 2024-03-25 | Stop reason: HOSPADM

## 2024-03-23 RX ORDER — SIMETHICONE 80 MG
80 TABLET,CHEWABLE ORAL 4 TIMES DAILY PRN
Status: DISCONTINUED | OUTPATIENT
Start: 2024-03-23 | End: 2024-03-25 | Stop reason: HOSPADM

## 2024-03-23 RX ORDER — MISOPROSTOL 200 UG/1
800 TABLET ORAL ONCE AS NEEDED
Status: DISCONTINUED | OUTPATIENT
Start: 2024-03-23 | End: 2024-03-25 | Stop reason: HOSPADM

## 2024-03-23 RX ORDER — OXYCODONE HYDROCHLORIDE 5 MG/1
10 TABLET ORAL EVERY 4 HOURS PRN
Status: DISCONTINUED | OUTPATIENT
Start: 2024-03-24 | End: 2024-03-25 | Stop reason: HOSPADM

## 2024-03-23 RX ORDER — BISACODYL 10 MG/1
10 SUPPOSITORY RECTAL DAILY PRN
Status: DISCONTINUED | OUTPATIENT
Start: 2024-03-23 | End: 2024-03-25 | Stop reason: HOSPADM

## 2024-03-23 RX ORDER — HYDRALAZINE HYDROCHLORIDE 20 MG/ML
5 INJECTION INTRAMUSCULAR; INTRAVENOUS ONCE AS NEEDED
Status: DISCONTINUED | OUTPATIENT
Start: 2024-03-23 | End: 2024-03-25 | Stop reason: HOSPADM

## 2024-03-23 RX ADMIN — ACETAMINOPHEN 975 MG: 325 TABLET ORAL at 19:54

## 2024-03-23 RX ADMIN — KETOROLAC TROMETHAMINE 30 MG: 30 INJECTION, SOLUTION INTRAMUSCULAR; INTRAVENOUS at 19:54

## 2024-03-23 RX ADMIN — PHENYLEPHRINE HYDROCHLORIDE 80 MCG: 10 INJECTION INTRAVENOUS at 00:26

## 2024-03-23 RX ADMIN — KETOROLAC TROMETHAMINE 30 MG: 30 INJECTION INTRAMUSCULAR; INTRAVENOUS at 00:47

## 2024-03-23 RX ADMIN — ACETAMINOPHEN 975 MG: 325 TABLET ORAL at 13:28

## 2024-03-23 RX ADMIN — KETOROLAC TROMETHAMINE 30 MG: 30 INJECTION, SOLUTION INTRAMUSCULAR; INTRAVENOUS at 07:26

## 2024-03-23 RX ADMIN — PHENYLEPHRINE HYDROCHLORIDE 80 MCG: 10 INJECTION INTRAVENOUS at 00:29

## 2024-03-23 RX ADMIN — ONDANSETRON 4 MG: 2 INJECTION, SOLUTION INTRAMUSCULAR; INTRAVENOUS at 00:36

## 2024-03-23 RX ADMIN — BUPIVACAINE HYDROCHLORIDE IN DEXTROSE 1.7 ML: 7.5 INJECTION, SOLUTION SUBARACHNOID at 00:14

## 2024-03-23 RX ADMIN — PHENYLEPHRINE HYDROCHLORIDE 80 MCG: 10 INJECTION INTRAVENOUS at 00:22

## 2024-03-23 RX ADMIN — ACETAMINOPHEN 975 MG: 325 TABLET ORAL at 07:26

## 2024-03-23 RX ADMIN — Medication 600 MILLI-UNITS/MIN: at 00:34

## 2024-03-23 RX ADMIN — PHENYLEPHRINE HYDROCHLORIDE 80 MCG: 10 INJECTION INTRAVENOUS at 00:18

## 2024-03-23 RX ADMIN — MORPHINE SULFATE 0.15 MG: 1 INJECTION, SOLUTION EPIDURAL; INTRATHECAL; INTRAVENOUS at 00:14

## 2024-03-23 RX ADMIN — LIDOCAINE 1 PATCH: 4 PATCH TOPICAL at 13:28

## 2024-03-23 RX ADMIN — PHENYLEPHRINE HYDROCHLORIDE 80 MCG: 10 INJECTION INTRAVENOUS at 00:20

## 2024-03-23 RX ADMIN — KETOROLAC TROMETHAMINE 30 MG: 30 INJECTION, SOLUTION INTRAMUSCULAR; INTRAVENOUS at 13:28

## 2024-03-23 ASSESSMENT — PAIN SCALES - GENERAL
PAIN_LEVEL: 0
PAINLEVEL_OUTOF10: 7

## 2024-03-23 ASSESSMENT — PAIN DESCRIPTION - LOCATION
LOCATION: ABDOMEN
LOCATION: ABDOMEN

## 2024-03-23 ASSESSMENT — PAIN - FUNCTIONAL ASSESSMENT
PAIN_FUNCTIONAL_ASSESSMENT: 0-10
PAIN_FUNCTIONAL_ASSESSMENT: 0-10

## 2024-03-23 NOTE — H&P
Obstetrical Admission History and Physical     Magdalena Bai is a 33 y.o. .     Chief Complaint: Contractions    Assessment/Plan    Principal Problem:    Multigravida in third trimester  Active Problems:    History of low transverse  section    Uterine contractions    Dr. Murray in to discuss with the patient RLTCS vs TOLAC, pt declines TOLAC     Pregnancy Problems (from 24 to present)       No problems associated with this episode.          Subjective   Magadlena is here complaining of q1-2 min contractions. Good fetal movement. Denies vaginal bleeding., Denies leaking of fluid.  She presents from home with contractions after falling into a car and hitting the right side of her abd. She states she does not want to TOLAC.          Obstetrical History   OB History    Para Term  AB Living   6 1 1 0 4 1   SAB IAB Ectopic Multiple Live Births   3 1 0 0 1      # Outcome Date GA Lbr Robinson/2nd Weight Sex Delivery Anes PTL Lv   6 Current            5 Term 21 38w3d  3.572 kg F CS-LTranv Spinal N YANET      Complications: Fetal Intolerance   4 2020           3 2014           2 IAB            1 2012               Past Medical History  Past Medical History:   Diagnosis Date    12 weeks gestation of pregnancy 11/10/2020    12 weeks gestation of pregnancy    16 weeks gestation of pregnancy 2020    16 weeks gestation of pregnancy    20 weeks gestation of pregnancy 2021    20 weeks gestation of pregnancy    24 weeks gestation of pregnancy 2021    24 weeks gestation of pregnancy    29 weeks gestation of pregnancy 2021    29 weeks gestation of pregnancy    33 weeks gestation of pregnancy 2021    33 weeks gestation of pregnancy    35 weeks gestation of pregnancy 2021    35 weeks gestation of pregnancy    36 weeks gestation of pregnancy 2021    36 weeks gestation of pregnancy    37 weeks gestation of pregnancy 2021    37 weeks  gestation of pregnancy    38 weeks gestation of pregnancy 05/11/2021    38 weeks gestation of pregnancy    Contact with and (suspected) exposure to viral hepatitis 08/26/2014    Exposure to hepatitis C    Encounter for supervision of other normal pregnancy, first trimester 11/10/2020    Encounter for supervision of other normal pregnancy, first trimester    Encounter for supervision of other normal pregnancy, second trimester 02/02/2021    Encounter for supervision of normal pregnancy in multigravida in second trimester    Personal history of other diseases of the respiratory system     History of acute bronchitis    Personal history of other diseases of the respiratory system 05/15/2019    History of acute bronchitis    Personal history of other diseases of the respiratory system 05/15/2019    History of acute sinusitis    Personal history of other diseases of the respiratory system 05/15/2019    History of allergic rhinitis    Personal history of other specified conditions 09/18/2017    History of insomnia    Personal history of urinary (tract) infections 10/23/2020    History of urinary tract infection    Rash and other nonspecific skin eruption 05/15/2019    Rash and nonspecific skin eruption        Past Surgical History   Past Surgical History:   Procedure Laterality Date    OTHER SURGICAL HISTORY  10/20/2020    No history of surgery       Social History  Social History     Tobacco Use    Smoking status: Never    Smokeless tobacco: Never   Substance Use Topics    Alcohol use: Not Currently     Substance and Sexual Activity   Drug Use Never       Allergies  Sulfa (sulfonamide antibiotics)     Medications  Medications Prior to Admission   Medication Sig Dispense Refill Last Dose    ondansetron ODT (Zofran-ODT) 4 mg disintegrating tablet Take 1 tablet (4 mg) by mouth every 8 hours if needed for nausea or vomiting. 30 tablet 5     prenatal vit 40-iron-folic-dha (Prenatal Multi-DHA, algal oil,) 27mg iron- 800  "mcg-250 mg capsule Prenatal Multi +DHA 27-0.8-250 MG Oral Capsule  Refills: 0  Start: 22-Aug-2023          Objective    Last Vitals  Temp Pulse Resp BP MAP O2 Sat   36.9 °C (98.4 °F) 79 (Simultaneous filing. User may not have seen previous data.) 18 110/61 (Simultaneous filing. User may not have seen previous data.)   97 % (Simultaneous filing. User may not have seen previous data.)     Physical Examination  GENERAL: Examination reveals a well developed, well nourished, gravid female in no acute distress. She is alert and cooperative.  ABDOMEN: soft, gravid, nontender, nondistended, no abnormal masses, no epigastric pain  FHR is  , with Accelerations, and a Category I tracing.    [place] reading:    The fetus is in a vertex presentation, determined by ultrasound and vaginal exam  Current Estimated Fetal Weight 6#12 established by ultrasound  CERVIX: Fingertip cm dilated, 50 % effaced, -3 station; MEMBRANES are  intact based on pt hx.   EXTREMITIES: no redness or tenderness in the calves or thighs, no edema  SKIN: normal coloration and turgor, no rashes  PSYCHOLOGICAL: awake and alert; oriented to person, place, and time    Lab Review  No results found for: \"ABO\", \"LABRH\", \"ABSCRN\"  No results found for: \"WBC\", \"HGB\", \"HCT\", \"PLT\"  No results found for: \"GRPBSTREP\"    "

## 2024-03-23 NOTE — ANESTHESIA POSTPROCEDURE EVALUATION
Patient: Magdalena Bai    Procedure Summary       Date: 24 Room / Location[facility]    Anesthesia Start: 0010 Anesthesia Stop:     Procedure:  Section Diagnosis:       Multigravida in third trimester      History of low transverse  section      Uterine contractions      (Multigravida in third trimester [Z34.83])      (History of low transverse  section [Z98.891])      (Uterine contractions [O47.9])    Surgeons: Adrian Murray MD Responsible Provider: BEBO Franz    Anesthesia Type: spinal ASA Status: 2            Anesthesia Type: spinal    Vitals Value Taken Time   /56 24 0122   Temp 36.7 24 0122   Pulse 75 24 0119   Resp 17 24 0122   SpO2 98 % 24 0119       Anesthesia Post Evaluation    Patient location during evaluation: bedside  Patient participation: complete - patient participated  Level of consciousness: awake and alert  Pain score: 0  Pain management: adequate  Multimodal analgesia pain management approach  Airway patency: patent  Cardiovascular status: acceptable  Respiratory status: acceptable  Hydration status: acceptable  Postoperative Nausea and Vomiting: none        There were no known notable events for this encounter.

## 2024-03-23 NOTE — ANESTHESIA PREPROCEDURE EVALUATION
Patient: Magdalena Bai    Evaluation Method: In-person visit    Procedure Information       Date/Time: 24 2330    Procedure:  Section    Location: [facility] OB 02 / [facility]    Surgeons: Adrian Murray MD            Relevant Problems   Neuro/Psych   (+) Depression with anxiety       Clinical information reviewed:   Tobacco  Allergies  Meds  Problems  Med Hx  Surg Hx   Fam Hx          NPO Detail:  NPO/Void Status  Date of Last Liquid: 24  Time of Last Liquid: 1500  Date of Last Solid: 24  Time of Last Solid: 1500         OB/Gyn Evaluation    Present Pregnancy    Patient is pregnant now.   Obstetric History    (+)  section - primary              Physical Exam    Airway  Mallampati: II  TM distance: >3 FB  Neck ROM: full     Cardiovascular - normal exam  Rhythm: regular  Rate: normal     Dental - normal exam     Pulmonary - normal exam  Breath sounds clear to auscultation     Abdominal            Anesthesia Plan    History of general anesthesia?: yes  History of complications of general anesthesia?: no    ASA 2     spinal     The patient is not a current smoker.    Anesthetic plan and risks discussed with patient.  Use of blood products discussed with who consented to blood products.    Plan discussed with CRNA.

## 2024-03-23 NOTE — CARE PLAN
The patient's goals for the shift include safe repeat c/s delivery.    The clinical goals for the shift include minimal bleeding in post partum recovery.

## 2024-03-23 NOTE — L&D DELIVERY NOTE
OB Delivery Note  3/23/2024  Magdalena Bai  33 y.o.   , Low Transverse        Gestational Age: 39w0d  /Para:   Quantitative Blood Loss: Admission to Discharge: 0 mL (3/22/2024  9:05 PM - 3/23/2024  1:19 AM)    Ivelisse Bai [[id number]]      Labor Events    Labor type:  Without Labor  Labor allowed to proceed with plans for an attempted vaginal birth?: No  Complications: None       Placenta    Placenta delivery date/time:   Placenta removal:        Lacerations    Episiotomy: None        Delivery    Birth date/time: 3/[birth year]/2024 00:33:00  Delivery type: , Low Transverse   categorization: repeat   priority: routine  Indications for : Repeat   Incision type: low transverse  Complications: None       Apgars    Living status: Living  Apgar Component Scores:  1 min.:  5 min.:  10 min.:  15 min.:  20 min.:    Skin color:  1  1       Heart rate:  1  2       Reflex irritability:  1  2       Muscle tone:  1  2       Respiratory effort:  1  2       Total:  5  9              Delivery Providers    Delivering clinician:    Provider Role     Delivery Nurse     Nursery Nurse     Resident                 Adrian Murray MD

## 2024-03-23 NOTE — OP NOTE
Date: 3/22/2024 - 3/23/2024  OR Location: [facility] OB    Name: Magdalena Bai, : 1990, Age: 33 y.o., MRN: [id number], Sex: female    Diagnosis  Pre-op Diagnosis     * Multigravida in third trimester [Z34.83]     * History of low transverse  section [Z98.891]     * Uterine contractions [O47.9] Post-op Diagnosis     * Multigravida in third trimester [Z34.83]     * History of low transverse  section [Z98.891]     * Uterine contractions [O47.9]     Procedures   Section  [address] - [place]  DELIVERY ONLY      Surgeons      * Adrian Murray - Primary    Resident/Fellow/Other Assistant:  Surgeon(s) and Role:    Procedure Summary  Anesthesia: Spinal  ASA: II  Anesthesia Staff: CRNA: CARI Franz-CRNA  Estimated Blood Loss: 700mL  Intra-op Medications:   Administrations occurring from 24 2330 to 24 0050:   Medication Name Total Dose   lactated Ringer's infusion 322.92 mL   ceFAZolin in dextrose (iso-os) (Ancef) IVPB 2 g 2 g   lactated Ringer's bolus 500 mL 500 mL   oxytocin (Pitocin) infusion in sodium chloride 0.9% 30 units/500 mL Cannot be calculated              Anesthesia Record               Intraprocedure I/O Totals          Intake    Oxytocin Drip 0.00 mL    The total shown is the total volume documented since Anesthesia Start was filed.    Total Intake 0 mL       Output    Urine 20 mL    Total Output 20 mL       Net    Net Volume -20 mL          Specimen: No specimens collected     Staff:   Circulator: Jessie Medellin RN         Informed Consent:  The risks, benefits, complications, and alternatives were discussed with the patient. The patient understood that the risks of  section include, but are not limited to: injury to nearby structures or organs, infection, blood loss and possible need for transfusion, and potential need for more surgery including hysterectomy. The patient stated understanding and desired to proceed. All questions were answered. The site  of surgery was properly noted and marked. The patient was identified as Magdalena Bai and the procedure verified as a  delivery. A Time Out was held and the above information confirmed.    Procedure Details:  The patient was taken to the operating room where   anesthesia was found to be adequate. Antibiotics were given for infection prophylaxis. She was prepped and draped in the normal sterile fashion in the dorsal supine position with leftward tilt. A Pfannenstiel skin incision was made with scalpel. The incision was carried down to the fascia with bovie cauterization. The fascia was incised and extended laterally with Courtney scissors. The inferior aspect of the fascia was grasped with Kocher clamps. The underlying rectus and pyramidalis muscles were dissected off with Courtney scissors. In a similar fashion, the superior aspect of the fascia was elevated with Kocher clamps, and the rectus muscle was dissected off. The rectus muscles were  bluntly down the midline down to the level of the pubic symphysis. The peritoneum was identified and entered bluntly. Peritoneal incision was extended superiorly and inferiorly with good visualization of the bladder.    The bladder blade was inserted,  The lower uterine segment was then incised with a low transverse  incision and was extended bluntly. The amniotic sac was ruptured and   color noted. The bladder blade was removed and the infant was delivered atraumatically using the usual maneuvers. The remainder of the infant was delivered, the cord clamped and cut, and the baby was handed off to the awaiting clinicians.     The placenta was removed via manual massage. The uterus was then exteriorized and cleared of all clots and debris. The hysterotomy was repaired with suture of 0 Vicryl in a running locked fashion. A second imbricating layer of the same suture was placed and good hemostasis observed. The ovaries and tubes appeared normal bilaterally. The  uterus, tubes, and ovaries were then returned to the abdomen and pelvis. The uterine incision was reinspected and found to be hemostatic. The subfascial spaces were inspected and noted to be hemostatic. The fascia was then reapproximated with two running sutures of 0 Vicryl tied at the midline. The skin was closed with 4-0 Vicryl sutures.    The patient tolerated the procedure well. Sponge, instrument, and needle counts were correct and the patient was taken to the recovery room in good and stable condition.    Findings: Fluid clear baby in vertex position    Complications:  None; patient tolerated the procedure well.     Disposition: PACU - hemodynamically stable.  Condition: stable

## 2024-03-23 NOTE — ANESTHESIA PROCEDURE NOTES
Spinal Block    Patient location during procedure: OR  Start time: 3/23/2024 12:11 AM  End time: 3/23/2024 12:15 AM  Reason for block: primary anesthetic  Staffing  Performed: CRNA   Authorized by: BEBO Franz    Performed by: BEBO Franz    Preanesthetic Checklist  Completed: patient identified, IV checked, risks and benefits discussed, surgical consent, monitors and equipment checked, pre-op evaluation, timeout performed and sterile techniques followed  Block Timeout  RN/Licensed healthcare professional reads aloud to the Anesthesia provider and entire team: Patient identity, procedure with side and site, patient position, and as applicable the availability of implants/special equipment/special requirements.  Patient on coagulant treatment: no  Timeout performed at: 3/23/2024 12:11 AM  Spinal Block  Patient position: sitting  Prep: ChloraPrep  Sterility prep: cap, drape, gloves, gown, hand hygiene and mask  Sedation level: no sedation  Patient monitoring: heart rate and continuous pulse oximetry  Approach: midline  Vertebral space: L3-4  Injection technique: single-shot  Needle  Needle type: pencil-point   Needle gauge: 25 G  Needle length: 3.5 in  Free flowing CSF: yes    Assessment  Sensory level: T4 bilateral  Block outcome: Allis test negative  Events: paresthesia  Procedure assessment: patient tolerated procedure well with no immediate complications

## 2024-03-24 PROBLEM — F19.11 HISTORY OF DRUG ABUSE (MULTI): Status: ACTIVE | Noted: 2024-03-24

## 2024-03-24 LAB
AMPHETAMINES UR QL SCN: ABNORMAL
BARBITURATES UR QL SCN: ABNORMAL
BENZODIAZ UR QL SCN: ABNORMAL
BZE UR QL SCN: ABNORMAL
CANNABINOIDS UR QL SCN: ABNORMAL
ERYTHROCYTE [DISTWIDTH] IN BLOOD BY AUTOMATED COUNT: 14 % (ref 11.5–14.5)
FENTANYL+NORFENTANYL UR QL SCN: ABNORMAL
HCT VFR BLD AUTO: 25 % (ref 36–46)
HGB BLD-MCNC: 7.8 G/DL (ref 12–16)
MCH RBC QN AUTO: 27.6 PG (ref 26–34)
MCHC RBC AUTO-ENTMCNC: 31.2 G/DL (ref 32–36)
MCV RBC AUTO: 88 FL (ref 80–100)
METHADONE UR QL SCN: ABNORMAL
NRBC BLD-RTO: 0 /100 WBCS (ref 0–0)
OPIATES UR QL SCN: ABNORMAL
OXYCODONE+OXYMORPHONE UR QL SCN: ABNORMAL
PCP UR QL SCN: ABNORMAL
PLATELET # BLD AUTO: 186 X10*3/UL (ref 150–450)
RBC # BLD AUTO: 2.83 X10*6/UL (ref 4–5.2)
WBC # BLD AUTO: 8.3 X10*3/UL (ref 4.4–11.3)

## 2024-03-24 PROCEDURE — 2500000001 HC RX 250 WO HCPCS SELF ADMINISTERED DRUGS (ALT 637 FOR MEDICARE OP): Performed by: NURSE PRACTITIONER

## 2024-03-24 PROCEDURE — 36415 COLL VENOUS BLD VENIPUNCTURE: CPT | Performed by: OBSTETRICS & GYNECOLOGY

## 2024-03-24 PROCEDURE — 80307 DRUG TEST PRSMV CHEM ANLYZR: CPT | Performed by: ADVANCED PRACTICE MIDWIFE

## 2024-03-24 PROCEDURE — 1100000001 HC PRIVATE ROOM DAILY

## 2024-03-24 PROCEDURE — 85027 COMPLETE CBC AUTOMATED: CPT | Performed by: OBSTETRICS & GYNECOLOGY

## 2024-03-24 RX ORDER — DOCUSATE SODIUM 100 MG/1
100 CAPSULE, LIQUID FILLED ORAL 2 TIMES DAILY PRN
Qty: 14 CAPSULE | Refills: 0 | Status: SHIPPED | OUTPATIENT
Start: 2024-03-24 | End: 2024-03-25 | Stop reason: SDUPTHER

## 2024-03-24 RX ORDER — IBUPROFEN 600 MG/1
600 TABLET ORAL EVERY 6 HOURS
Qty: 56 TABLET | Refills: 0 | Status: SHIPPED | OUTPATIENT
Start: 2024-03-24 | End: 2024-03-25 | Stop reason: SDUPTHER

## 2024-03-24 RX ORDER — ACETAMINOPHEN 325 MG/1
975 TABLET ORAL EVERY 6 HOURS PRN
Qty: 168 TABLET | Refills: 0 | Status: SHIPPED | OUTPATIENT
Start: 2024-03-24 | End: 2024-03-25 | Stop reason: SDUPTHER

## 2024-03-24 RX ORDER — LIDOCAINE 560 MG/1
1 PATCH PERCUTANEOUS; TOPICAL; TRANSDERMAL
Qty: 7 PATCH | Refills: 0 | Status: SHIPPED | OUTPATIENT
Start: 2024-03-24 | End: 2024-03-31

## 2024-03-24 RX ADMIN — IBUPROFEN 600 MG: 600 TABLET, FILM COATED ORAL at 18:45

## 2024-03-24 RX ADMIN — OXYCODONE HYDROCHLORIDE 10 MG: 5 TABLET ORAL at 10:17

## 2024-03-24 RX ADMIN — ACETAMINOPHEN 975 MG: 325 TABLET ORAL at 01:08

## 2024-03-24 RX ADMIN — ACETAMINOPHEN 975 MG: 325 TABLET ORAL at 06:34

## 2024-03-24 RX ADMIN — ACETAMINOPHEN 975 MG: 325 TABLET ORAL at 18:45

## 2024-03-24 RX ADMIN — OXYCODONE HYDROCHLORIDE 10 MG: 5 TABLET ORAL at 02:54

## 2024-03-24 RX ADMIN — OXYCODONE HYDROCHLORIDE 5 MG: 5 TABLET ORAL at 22:24

## 2024-03-24 RX ADMIN — ACETAMINOPHEN 975 MG: 325 TABLET ORAL at 12:43

## 2024-03-24 RX ADMIN — IBUPROFEN 600 MG: 600 TABLET, FILM COATED ORAL at 06:34

## 2024-03-24 RX ADMIN — IBUPROFEN 600 MG: 600 TABLET, FILM COATED ORAL at 01:08

## 2024-03-24 RX ADMIN — IBUPROFEN 600 MG: 600 TABLET, FILM COATED ORAL at 12:43

## 2024-03-24 ASSESSMENT — PAIN SCALES - GENERAL
PAINLEVEL_OUTOF10: 6
PAINLEVEL_OUTOF10: 7
PAINLEVEL_OUTOF10: 5 - MODERATE PAIN
PAINLEVEL_OUTOF10: 7
PAINLEVEL_OUTOF10: 9
PAINLEVEL_OUTOF10: 9
PAINLEVEL_OUTOF10: 8

## 2024-03-24 ASSESSMENT — PAIN - FUNCTIONAL ASSESSMENT
PAIN_FUNCTIONAL_ASSESSMENT: 0-10

## 2024-03-24 ASSESSMENT — PAIN DESCRIPTION - LOCATION
LOCATION: ABDOMEN

## 2024-03-24 NOTE — PROGRESS NOTES
Patient voiced to RN that she would like to go home.  Dr. Michael does not feel comfortable with sending baby home secondary to patient's past history of drug use.    She has not had a UDS this pregnancy.  Discussed with Dr. Michael that if we send that there would potentially be Fentanyl and Opioids in her system secondary to spinal and oral pain meds post op.    I discussed with the patient the recommendations and plan.  Also discussed that we are aware that the test will show positive for opioids.  Patient agrees with giving specimen.    Will plan on discharge tomorrow after pediatric clearance.      03/24/24 at 4:54 PM - ISAEL Grewal

## 2024-03-24 NOTE — LACTATION NOTE
Lactation Consultant Note  Lactation Consultation  Reason for Consult: Follow-up assessment  Consultant Name: SUMEET Maguire    Maternal Information  Has mother  before?: Yes  How long did the mother previously breastfeed?:  (few weeks)  Previous Maternal Breastfeeding Challenges: Difficult latch  Infant to breast within first 2 hours of birth?: Yes  Exclusive Pump and Bottle Feed: No    Maternal Assessment  Breast Assessment: Large  Nipple Assessment: Intact  Areola Assessment: Normal    Infant Assessment  Infant Behavior: Light sleep    Feeding Assessment  Nutrition Source: Breastmilk, Formula (per mother’s request)  Feeding Method: Paced bottle, Nursing at the breast  Unable to assess infant feeding at this time: Maternal request (Infant just fed)    LATCH TOOL       Breast Pump  Pump: Hospital grade electric pump  Frequency: 8-10 times per day  Duration: 15-20 minutes per session    Other OB Lactation Tools  Lactation Tools: Lanolin    Patient Follow-up  Inpatient Lactation Follow-up Needed : Yes    Other OB Lactation Documentation       Recommendations/Summary  Late Note Entry:  In to see mom. Mom states she has BF before. Infant less than 24 hours at this time. Mom states infant latches easily to the breast. Mom states she struggled with last child latching. Infant currently feeding at the breast. Mom mom states comfort latch.   Discussed normal  behaviors including sleepiness and cluster feeding. Discussed expectations for output. Discussed risks with formula supplementation. Outpatient resources reviewed.  -Initial lactation visit paperwork given. Outpatient flyer discussed. PI forms #197 Nursing your baby,174 is My  baby getting enough,168 ,167 Sore and cracked nipples,123 Tips for pumping,162 tips to increase milk supply,163 Breast fullness and Engorgement,728  Breast Massage with Milk Expression by Hand,165 Returning to work,682 breastfeeding and Birth Control and how to clean breast  pumps.

## 2024-03-24 NOTE — LACTATION NOTE
Lactation Consultant Note  Lactation Consultation  Reason for Consult: Follow-up assessment  Consultant Name: SUMEET Maguire    Maternal Information  Has mother  before?: Yes  How long did the mother previously breastfeed?:  (few weeks)  Previous Maternal Breastfeeding Challenges: Difficult latch  Infant to breast within first 2 hours of birth?: Yes  Exclusive Pump and Bottle Feed: No    Maternal Assessment  Breast Assessment: Large  Nipple Assessment: Intact  Areola Assessment: Normal    Infant Assessment  Infant Behavior: Light sleep    Feeding Assessment  Nutrition Source: Breastmilk, Formula (per mother’s request)  Feeding Method: Paced bottle, Nursing at the breast  Unable to assess infant feeding at this time: Maternal request (Infant just fed)    LATCH TOOL       Breast Pump  Pump: Hospital grade electric pump  Frequency: 8-10 times per day  Duration: 15-20 minutes per session    Other OB Lactation Tools  Lactation Tools: Lanolin    Patient Follow-up  Inpatient Lactation Follow-up Needed : Yes    Other OB Lactation Documentation       Recommendations/Summary  In to see mom. Mom states she started giving infant formula during the night due to discomfort and cluster feeding. Mom supported and encouraged. Reviewed normal  behaviors with mom. Mom has not pumped since giving bottles. Discussed normal milk production and importance of breast stimulation. Pump set up for mom and discussed use. Discussed cleaning and milk storage with mom. Encouraged mom to call during next feed so I can assist with latch. Mom agreeable with plan    -1545: in to follow up with patient for assistance with feed. Mom has visitors giving infant bottle. Mom states she pumped but would like to just give bottles for now.

## 2024-03-24 NOTE — ANESTHESIA POSTPROCEDURE EVALUATION
Patient: Magdalena Bai    Procedure Summary       Date: 24 Room / Location: [facility] OB 02 / [facility] OB    Anesthesia Start: 10 Anesthesia Stop: 116    Procedure:  Section Diagnosis:       Multigravida in third trimester      History of low transverse  section      Uterine contractions      (Multigravida in third trimester [Z34.83])      (History of low transverse  section [Z98.891])      (Uterine contractions [O47.9])    Surgeons: Adrian Murray MD Responsible Provider: BEBO Franz    Anesthesia Type: spinal ASA Status: 2            Anesthesia Type: spinal    Vitals Value Taken Time   /56 24   Temp 36.8 °C (98.2 °F) 24   Pulse 72 24   Resp 18 24   SpO2 97 % 24       Anesthesia Post Evaluation    Patient location during evaluation: bedside  Patient participation: complete - patient participated  Level of consciousness: awake and alert  Pain management: satisfactory to patient  Multimodal analgesia pain management approach  Airway patency: patent  Cardiovascular status: acceptable  Respiratory status: acceptable  Hydration status: acceptable  Postoperative Nausea and Vomiting: none  Comments: No redness, swelling, or drainage at puncture site.    Complete resolution of numbness. Patient is able to lift legs, bend at the knees, and ambulate.    Patient denies problems with urination.    Patient denies nausea, headache or severe back pain.         There were no known notable events for this encounter.

## 2024-03-24 NOTE — PROGRESS NOTES
Postpartum Progress Note    Assessment/Plan   Magdalena Bai is a 33 y.o., , who delivered at 39w0d gestation and is now postpartum day 1.     Hitting day 1 milestones   Encouraged ambulation   Encouraged to stay on top of pain control   Lactation support as needed    Principal Problem:    Multigravida in third trimester  Active Problems:    History of low transverse  section    Uterine contractions    Pregnancy Problems (from 23 to present)       Problem Noted Resolved    Uterine contractions 3/22/2024 by ISAEL Farias, APRN-CNP No    Priority:  Medium      Multigravida in third trimester 3/22/2024 by Lexi Finney, ISAEL, APRN-CNP No    Priority:  Medium            Hospital course: no complications  Mother's blood type is RH +, no rhogam needed    Subjective   Her pain is moderately controlled with current medications  She is passing flatus  She is ambulating well - reports hurting to stand up straight  She is tolerating a Adult diet Regular  She reports no breast or nursing problems  She denies emotional concerns today   Her plan for contraception is none     Will discuss at 4 week postpartum check    Objective   Allergies:   Sulfa (sulfonamide antibiotics)         Last Vitals:  Temp Pulse Resp BP MAP Pulse Ox   37 °C (98.6 °F) 86 18 119/74   96 %     Vitals Min/Max Last 24 Hours:  Temp  Min: 36.5 °C (97.7 °F)  Max: 37.1 °C (98.8 °F)  Pulse  Min: 68  Max: 86  Resp  Min: 16  Max: 18  BP  Min: 97/58  Max: 121/75    Intake/Output:     Intake/Output Summary (Last 24 hours) at 3/24/2024 0840  Last data filed at 3/23/2024 1515  Gross per 24 hour   Intake --   Output 400 ml   Net -400 ml       Physical Exam:  General: Examination reveals a well developed, well nourished, female, in no acute distress. She is alert and cooperative.  Lungs: even and unlabored.  Breasts: lactating, no erythema or tenderness, nipples normal.  Abdomen: soft, gravid, nontender, nondistended, no  abnormal masses, no epigastric pain.  Incision: Dressing intact.  Fundus: firm and below umbilicus.  Extremities: no redness or tenderness in the calves or thighs, no edema.  Neurological: alert, oriented, normal speech, no focal findings or movement disorder noted.  Psychological: awake and alert; oriented to person, place, and time.    Lab Data:  Labs in chart were reviewed.

## 2024-03-25 ENCOUNTER — APPOINTMENT (OUTPATIENT)
Dept: OBSTETRICS AND GYNECOLOGY | Facility: CLINIC | Age: 34
End: 2024-03-25
Payer: COMMERCIAL

## 2024-03-25 VITALS
BODY MASS INDEX: 32.31 KG/M2 | WEIGHT: 189.26 LBS | TEMPERATURE: 99 F | SYSTOLIC BLOOD PRESSURE: 121 MMHG | HEART RATE: 75 BPM | OXYGEN SATURATION: 98 % | RESPIRATION RATE: 18 BRPM | HEIGHT: 64 IN | DIASTOLIC BLOOD PRESSURE: 64 MMHG

## 2024-03-25 DIAGNOSIS — O34.211 ENCOUNTER FOR MATERNAL CARE FOR LOW TRANSVERSE SCAR FROM REPEAT CESAREAN DELIVERY (HHS-HCC): ICD-10-CM

## 2024-03-25 PROBLEM — Z34.83 MULTIGRAVIDA IN THIRD TRIMESTER (HHS-HCC): Status: RESOLVED | Noted: 2024-03-22 | Resolved: 2024-03-25

## 2024-03-25 PROBLEM — O47.9 UTERINE CONTRACTIONS (HHS-HCC): Status: RESOLVED | Noted: 2024-03-22 | Resolved: 2024-03-25

## 2024-03-25 LAB
BLOOD EXPIRATION DATE: NORMAL
DISPENSE STATUS: NORMAL
PRODUCT BLOOD TYPE: 6200
PRODUCT CODE: NORMAL
UNIT ABO: NORMAL
UNIT NUMBER: NORMAL
UNIT RH: NORMAL
UNIT VOLUME: 350
XM INTEP: NORMAL

## 2024-03-25 PROCEDURE — 2500000001 HC RX 250 WO HCPCS SELF ADMINISTERED DRUGS (ALT 637 FOR MEDICARE OP): Performed by: NURSE PRACTITIONER

## 2024-03-25 RX ORDER — DOCUSATE SODIUM 100 MG/1
100 CAPSULE, LIQUID FILLED ORAL 2 TIMES DAILY PRN
Qty: 14 CAPSULE | Refills: 0 | Status: SHIPPED | OUTPATIENT
Start: 2024-03-25 | End: 2024-04-01

## 2024-03-25 RX ORDER — ACETAMINOPHEN 325 MG/1
975 TABLET ORAL EVERY 6 HOURS PRN
Qty: 168 TABLET | Refills: 0 | Status: SHIPPED | OUTPATIENT
Start: 2024-03-25 | End: 2024-04-08

## 2024-03-25 RX ORDER — IBUPROFEN 600 MG/1
600 TABLET ORAL EVERY 6 HOURS
Qty: 56 TABLET | Refills: 0 | Status: SHIPPED | OUTPATIENT
Start: 2024-03-25 | End: 2024-04-08

## 2024-03-25 RX ADMIN — ACETAMINOPHEN 975 MG: 325 TABLET ORAL at 06:52

## 2024-03-25 RX ADMIN — ACETAMINOPHEN 975 MG: 325 TABLET ORAL at 01:22

## 2024-03-25 RX ADMIN — IBUPROFEN 600 MG: 600 TABLET, FILM COATED ORAL at 06:52

## 2024-03-25 RX ADMIN — IBUPROFEN 600 MG: 600 TABLET, FILM COATED ORAL at 01:22

## 2024-03-25 ASSESSMENT — PAIN SCALES - GENERAL
PAINLEVEL_OUTOF10: 5 - MODERATE PAIN
PAINLEVEL_OUTOF10: 6

## 2024-03-25 NOTE — CARE PLAN
The patient's goals for the shift include infant care/bonding    The clinical goals for the shift include free from injury until end of shift

## 2024-03-25 NOTE — CARE PLAN
The patient's goals for the shift include infant care/bonding      Problem: Vaginal Birth or  Section  Goal: Fetal and maternal status remain reassuring during the birth process  Outcome: Adequate for Discharge  Goal: Tolerate CRB for IOL placement maintenance until dislodgement/removal 12hrs after placement  Outcome: Adequate for Discharge  Goal: Prevention of malpresentation/labor dystocia through delivery  Outcome: Adequate for Discharge  Goal: Demonstrates labor coping techniques through delivery  Outcome: Adequate for Discharge  Goal: Minimal s/sx of HDP and BP<160/110  Outcome: Adequate for Discharge  Goal: No s/sx of infection through recovery  Outcome: Adequate for Discharge  Goal: No s/sx of hemorrhage through recovery  Outcome: Adequate for Discharge     Problem:  Recovery Care  Goal: Verbalizes understanding of post-op instructions  Outcome: Adequate for Discharge  Goal: Manages discomfort  Outcome: Adequate for Discharge  Goal: Dressing intact until removed with any drainage marked  Outcome: Adequate for Discharge  Goal: Patient vital signs are stable  Outcome: Adequate for Discharge  Goal: Urine output is 0.5 mL/kg/hr or more  Outcome: Adequate for Discharge  Goal: Fundus firm at midline  Outcome: Adequate for Discharge

## 2024-03-25 NOTE — DISCHARGE SUMMARY
Discharge Summary    Admission Date: 3/22/2024  Discharge Date: 2024    Discharge Diagnosis  Multigravida in third trimester    Hospital Course  Delivery Date: 3/23/2024  12:33 AM   Delivery type: , Low Transverse    GA at delivery: 39w0d  Outcome: Living   Anesthesia during delivery: Spinal   Intrapartum complications: None   Feeding method: Breastfeeding Status: Yes     Procedures: none  Contraception at discharge: none      Pertinent Physical Exam At Time of Discharge    See postpartum progress note    Discharge Meds     Your medication list        START taking these medications        Instructions Last Dose Given Next Dose Due   acetaminophen 325 mg tablet  Commonly known as: Tylenol      Take 3 tablets (975 mg) by mouth every 6 hours if needed for mild pain (1 - 3) or moderate pain (4 - 6) for up to 14 days.       docusate sodium 100 mg capsule  Commonly known as: Colace      Take 1 capsule (100 mg) by mouth 2 times a day as needed for constipation for up to 7 days.       ibuprofen 600 mg tablet      Take 1 tablet (600 mg) by mouth every 6 hours for 14 days.       lidocaine 4 % patch      Place 1 patch over 12 hours on the skin every 24 (twenty four) hours if needed for moderate pain (4 - 6) for up to 7 days. Remove & discard patch within 12 hours or as directed by MD.              CONTINUE taking these medications        Instructions Last Dose Given Next Dose Due   Prenatal Multi-DHA (algal oil) 27mg iron- 800 mcg-250 mg capsule  Generic drug: prenatal vit 40-iron-folic-dha                  STOP taking these medications      ondansetron ODT 4 mg disintegrating tablet  Commonly known as: Zofran-ODT                  Where to Get Your Medications        These medications were sent to [facility]  [address], [address], [facility] [address]      Hours: 8 AM to 6 PM Mon-Fri, 9 AM to 1 PM Saturday Phone: [phone]   acetaminophen 325 mg tablet  docusate sodium 100 mg  capsule  ibuprofen 600 mg tablet  lidocaine 4 % patch          Complications Requiring Follow-Up  2 week incision check    Test Results Pending At Discharge  Pending Labs       No current pending labs.            Outpatient Follow-Up  Future Appointments   Date Time Provider Department Center   3/25/2024  3:00 PM Yuniel Preston MD [id number]        spent 15 minutes in the professional and overall care of this patient.      CARI Grewal-AMANDA

## 2024-03-25 NOTE — PROGRESS NOTES
Postpartum Progress Note    Assessment/Plan   Magdalena Bai is a 33 y.o., , who delivered at 39w0d gestation and is now postpartum day 2.     Stable for discharge   Cleared from    RTO 2 weeks for incision check   To take dressing off POD 7   Hitting all milestones for POD 2    Active Problems:    History of low transverse  section    History of drug abuse (CMS/HCC)    Single liveborn, born in hospital, delivered by  delivery    Term birth of female     Pregnancy Problems (from 23 to present)       Problem Noted Resolved    Uterine contractions 3/22/2024 by ISAEL Farias, APRN-CNP 3/25/2024 by ISAEL Glaser    Priority:  Medium      Multigravida in third trimester 3/22/2024 by ISAEL Farias, APRN-CNP 3/25/2024 by ISAEL Glaser    Priority:  Medium            Hospital course: no complications   section delivery  Breastfeeding without concerns  RH +    Subjective   Her pain is well controlled with current medications  She is passing flatus  She is ambulating well  She is tolerating a Adult diet Regular  She reports no breast or nursing problems  She denies emotional concerns today   Her plan for contraception is none         Objective   Allergies:   Sulfa (sulfonamide antibiotics)         Last Vitals:  Temp Pulse Resp BP MAP Pulse Ox   37.2 °C (99 °F) 75 18 121/64   98 %     Vitals Min/Max Last 24 Hours:  Temp  Min: 37 °C (98.6 °F)  Max: 37.2 °C (99 °F)  Pulse  Min: 75  Max: 79  Resp  Min: 16  Max: 18  BP  Min: 121/64  Max: 130/83    Intake/Output:   No intake or output data in the 24 hours ending 24 0920    Physical Exam:  General: Examination reveals a well developed, well nourished, female, in no acute distress. She is alert and cooperative.  Incision: Dressing remains intact.  Neurological: alert, oriented, normal speech, no focal findings or movement disorder noted.  Psychological: awake and alert;  oriented to person, place, and time.    Lab Data:  Labs in chart were reviewed.

## 2024-03-25 NOTE — PROGRESS NOTES
Social Work Assessment       Patient: Magdalena Bai  Address: [address] [place] [address]  Phone: [phone]    Referral Reason: h/o of substance use    Prenatal Care:  Dr. Preston    [place] Name: Leann   : 3/23/24    Other Children: Nakia 21    Household Composition: Parents lives at the above address with their children. FOB/SO Rg Mcguire has an 8 yr old son from a previous relationship and they have a daughter together Nakia together.    IPV/DV or Safety Concerns: None    Car-Seat: Yes  Safe Sleep Space: Discussed  Safe Sleep Education: Discussed    Transportation Concerns: None    School/Work/Income: Parents are both employed    Insurance: [facility]    Mental Health Diagnoses: Anxiety and Depression  Medication(s): None  Counseling: Has participated in the past but is doing well now and does not feel like it would be beneficial    Supports: parents both have family support    Substance Use History: Heroin, marijuana    Toxicology Screens: 3/24/24- negative except for oxycodone which was given during this admission    Department of Children and Family Services (DCFS): Previous involvement in  during the birth of their first child. Ms. Bai reports that their daughter was supervised with family while she completed treatment. She reports that she has been doing well and been sober since.       Assessment:  received a referral due to Ms. Bai's history of substance use. SW was able to review chart and speak to the bedside nurse. SW met with parents bedside to complete assessment. Ms. Bai said she is feeling well following the birth of her daughter Leann by repeat  on 3/23/24. FOB/SO Rg Mcguire also present for assessment. Both parents were receptive to the conversation. Ms. Bai states that she lives at the above address with Rg and their children. Both parents are employed and Ms. Bai is on WIC. She has a history  of anxiety and depression but no meds. She said she has participated in counseling in the past but feels like she is doing well and does not need any referrals. She has a history of substance use. She reports that she used heroin in the past and that her last use was in 2016. She had positive tox screens in 2020/2021 for marijuana and oxycodone. She reports that she has been sober since then. She was involved with DCFS in May 2021 when her daughter was born because of a positive drug screen for marijuana. She said she completed a treatment program and the case was closed after a month. She reports no DCFS involvement since. She declines needing any resources. Parents will be using [facility] for pediatric follow up care.        Plan: Baby cleared for discharged from a  perspective once medically cleared. Parents reports that they are doing well and need no additional resources for discharge.       Signature: GURMEET Dumont

## 2024-04-03 ENCOUNTER — TELEPHONE (OUTPATIENT)
Dept: OBSTETRICS AND GYNECOLOGY | Facility: CLINIC | Age: 34
End: 2024-04-03
Payer: COMMERCIAL

## 2024-04-03 NOTE — TELEPHONE ENCOUNTER
Patient states she delivered on 3/23/24 via .  Currently the bandage is still on the incision and is starting to peel off.  She would like to know if she should remove the bandage or wait until her visit on Monday?    Rhina Gonsalez MA

## 2024-04-05 PROBLEM — R10.9 ABDOMINAL PAIN: Status: RESOLVED | Noted: 2024-04-05 | Resolved: 2024-04-05

## 2024-04-05 PROBLEM — N91.1 SECONDARY AMENORRHEA: Status: RESOLVED | Noted: 2024-04-05 | Resolved: 2024-04-05

## 2024-04-25 PROBLEM — M54.9 BACK PAIN: Status: ACTIVE | Noted: 2024-04-25

## 2024-04-25 PROBLEM — N91.1 AMENORRHEA, SECONDARY: Status: RESOLVED | Noted: 2023-10-11 | Resolved: 2024-04-25

## 2024-04-26 ENCOUNTER — OFFICE VISIT (OUTPATIENT)
Dept: OBSTETRICS AND GYNECOLOGY | Facility: CLINIC | Age: 34
End: 2024-04-26
Payer: COMMERCIAL

## 2024-04-26 VITALS — SYSTOLIC BLOOD PRESSURE: 110 MMHG | WEIGHT: 158 LBS | BODY MASS INDEX: 27.12 KG/M2 | DIASTOLIC BLOOD PRESSURE: 80 MMHG

## 2024-04-26 NOTE — PROGRESS NOTES
"Chief Complaint   Patient presents with    Postpartum Follow-up     5 Week PPV/Incision Check    [id number]  Delivered 3/23/24  Girl \"Leann\"  Delivered by Dr. Murray  Breastfeeding and Supplementing with Enfamil Gentlease    C/O left side of incision leaking puss, irritated, uncomfortable, and abscessed.  Other than that patient is feeling well.    Chaperone declined.       Patient is 4-week status post low-transverse  section.  Overall feeling well.  This past week noticed some separation of the skin edges of the left portion of her incision and some drainage.  Denies fevers.  Overall feels well.    REVIEW OF SYSTEMS    Please see HPI for reported pertinent positives, which would supersede this ROS    Constitutional:  Denies fever, chills, wt gain or loss, fatigue    Genito-Urinary:  Denies genital lesion or sores, vaginal dryness, itching  or pain.  No abnormal vaginal discharge or unexplained vaginal bleeding.  No dysuria, urinary incontinence or frequency.  Denies pelvic pain, dysmenorrhea or dyspareunia.    Eyes:  Denies vision changes, dryness  ENT:  No hearing loss, sinus pain or congestion, nosebleeds  Cardiovascular:  No chest pain or palpitations  Respiratory:  No SOB, cough, wheezing  GI:  No Nausea, vomiting, diarrhea, constipation, abdominal pain  Musculoskeletal:  No new back pain. joint pain, peripheral edema  Skin:  No rash or skin lesion  Neurologic:  No HA, numbness or dizziness  Psychiatric:  No new anxiety or depression  Endocrine:  No loss of hair or hirsutism  Hematologic/lymphatic:  No swollen lymph nodes.  No reported tendency for easy bruising or bleeding    Patient admits to no other systemic complaints      Vitals:    24 0952   BP: 110/80       PHYSICAL EXAM      PSYCH:  Pt is alert, oriented and cooperative    SKIN: warm, dry, w/o lesion    HEAD AND FACE:  External inspection of eyes, ears, functional cranial nerves normal and intact    THYROID:  No " thyromegaly    CARDIOVASCULAR:  Warm and well Perfused    PULMONARY:  No respiratory distress    ABDOMEN:  soft, nontender.  No mass or organomegaly.    Incision clean and healing well.  On left side for a length of approximately 7 mm wide, 2 to 3 mm deep there is a superficial separation.  This was cleaned with a Q-tip down to healthy granulation tissue.  No more depth than originally seen.  No obvious evidence of localized cellulitis.    Assessment/Plan    Diagnoses and all orders for this visit:  Routine postpartum follow-up ([facility]) -slight wound separation with no evidence of cellulitis.  Currently no antibiotics needed.  Continue routine local care 2-3 times per day and anticipate entire resolution within the next 2 weeks.  Call if not following this path or develops systemic symptoms.

## (undated) DEVICE — DRAPE PACK, CESAREAN SECTION, CUSTOM, UHC

## (undated) DEVICE — SUTURE, VICRYL 0, 36 IN, CT-1, VIOLET

## (undated) DEVICE — GLOVE, SURGICAL, PROTEXIS PI , 7.5, PF, LF

## (undated) DEVICE — SUTURE, VICRYL, 4-0, 27 IN, KS, UNDYED

## (undated) DEVICE — GOWN, SURGICAL, SMARTGOWN, XLARGE, STERILE

## (undated) DEVICE — SUTURE, VICRYL, 0, 36 IN, CT, UNDYED

## (undated) DEVICE — DRESSING, MEPILEX BORDER, POST-OP AG, 4 X 10 IN

## (undated) DEVICE — SOLUTION, IRRIGATION, SODIUM CHLORIDE 0.9%, 1000 ML, POUR BOTTLE

## (undated) DEVICE — Device

## (undated) DEVICE — CATHETER TRAY, SURESTEP, 16FR, URINE METER W/STATLOCK

## (undated) DEVICE — COVER HANDLE LIGHT, STERIS, BLUE, STERILE

## (undated) DEVICE — STRAP, VELCRO, BODY, 4 X 60IN, NS